# Patient Record
Sex: MALE
[De-identification: names, ages, dates, MRNs, and addresses within clinical notes are randomized per-mention and may not be internally consistent; named-entity substitution may affect disease eponyms.]

---

## 2019-01-31 ENCOUNTER — HOSPITAL ENCOUNTER (INPATIENT)
Dept: HOSPITAL 31 - C.ER | Age: 29
LOS: 2 days | Discharge: HOME | DRG: 751 | End: 2019-02-02
Attending: PSYCHIATRY & NEUROLOGY | Admitting: PSYCHIATRY & NEUROLOGY
Payer: MEDICAID

## 2019-01-31 VITALS — OXYGEN SATURATION: 99 % | RESPIRATION RATE: 18 BRPM

## 2019-01-31 DIAGNOSIS — R45.851: ICD-10-CM

## 2019-01-31 DIAGNOSIS — Z65.3: ICD-10-CM

## 2019-01-31 DIAGNOSIS — F33.2: Primary | ICD-10-CM

## 2019-01-31 DIAGNOSIS — F10.20: ICD-10-CM

## 2019-01-31 NOTE — PCM.PSYCH
Initial Psychiatric Evaluation





- Initial Psychiatric Evaluation


Type of Admission: Voluntary


Legal Status: Capacity


Chief Complaint (in patient's own words): 





I was feeling depressed and suicidal.


History of Present Illness and Precipitating Events: 





Patient is a 28 years old  male, who was transferred from [facility], came to the ED with depressed mood and suicidal ideation.





Patient reports history of few inpatient psychiatric hospitalizations.  However 

he denies any follow-up with any psychiatrist.  Patient reports that he is been 

drinking since 12 years of age.  He reports of drinking 3-4 beers daily..  To

xicology is positive for PCP as well, however patient denies.  As per the 

patient yesterday he became increasingly depressed and developed suicidal 

ideation and came to the hospital to get help.  He reports depressed mood, at 

times feelings of hopelessness and helplessness.  He also reports poor sleep and

poor appetite.  He denies any auditory or visual hallucination and paranoia.





He that last time he had a verbal altercation with the girlfriend, he wrote a 

note on Facebook that he wants to die in peace.  Girlfriend got concerned, and 

she called the police and patient was escorted to the ED for further evaluation.

 (Last time) Patient reports history of legal charges including high check in 

the car, trespassing and street fights.  He reports that he spent 3 months in 

penitentiary, and currently he is on probation.





Past psychiatric history


None reported








Past Psychiatric History





- Past Psychiatric History


Previous Treatment History: Inpatient


Pertinent Medical Hx (Current Medical&Sleep Prob, Allergies): 





                                    Allergies











Allergy/AdvReac Type Severity Reaction Status Date / Time


 


No Known Allergies Allergy   Unverified 01/31/19 09:04








                                        





No Known Home Med  01/31/19 











Review of Systems





- Review of Systems


All systems: reviewed and no additional remarkable complaints except





- Psychiatric


Psychiatric: Anxiety, Hopelessness, Irritability, Suicidal Ideation





Mental Status Examination





- Personal Presentation


Personal Presentation: Looks stated age





- Affect


Affect: Constricted, Depressed





- Motor Activity


Motor Activity: Calm





- Reliability in Providing Information


Reliability in Providing Information: Fair





- Speech


Speech: Organized





- Mood


Mood: Depressed, Anxious





- Formal Thought Process


Formal Thought Process: Delusions, Loosening of associations





- Obsessions/Compulsions


Obsessions: No


Compulsions: No





- Cognitive Functions


Orientation: Person, Place, Situation, Time


Sensorium: Alert


Attention/Concentration: Attentive


Abstract Thinking: [place]


Estimate of Intelligence: Below average


Judgement: Imparied, as evidence by: Poor judgement, Imparied, as evidence by: 

Lack of insight into illness





- Risk


Risk: Suicidal, Diminished functioning





- Limitations


Limitations: Living alone





DSM 5 DX





- DSM 5


DSM 5 Diagnosis: 





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


Alcohol use disorder moderate





- Recommended/Plan of Treatment


Treatment Recommendations and Plan of Treatment: 





CBT


Psychoeducation supportive therapy


[place] therapy and group therapy and group therapy


Augmentation


Testing for insomnia


Depakote stabilization of mood


Trazodone for insomnia





- Smoking Cessation


Smoking Cessation Initiated: No
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
## 2019-01-31 NOTE — PCM.BM
<Suzanna Morrissey - Last Filed: 01/31/19 12:18>





Treatment Plan Problems





- Problems identified on initial assessmt


  ** Alterate thought process


Date Initiated: 01/31/19


Time Initiated: 09:45


Date resolved: 01/31/19


Assessment reference: NA


Status: Active





  ** Ineffective coping skills


Date Initiated: 01/31/19


Time Initiated: 09:45


Date resolved: 01/31/19


Assessment reference: NA


Status: Active





Treatment assets and liabiliti


Patient Assests: insightful, resourceful, physically healthy


Patient Liabilities: financial problems, substance abuse, legal issue





- Milieu Protocol


Maintain good personal hygiene: daily Encourage regular showers, daily Remind 

patient to perform daily oral care, daily Assist patient to perform ADL's


Maintain personal safety: every shift Educate patient to report safety concerns 

to staff, every shift Monitor environment for contraband/sharps


Medication safety: Monitor for expected outcome, potential side effects: every 

shift, Assess barriers to learning: every shift, Assess readiness for medication

education: every shift





<Cristela Salgado - Last Filed: 02/01/19 11:40>





Family Contact


Family involvement: Family/SO is involved


Family contact: Patient declines to allow family contact at present





- Goals for Treatment


Patient goals for treatment: "I need an outpatient program."





Discharge/Continuing Care





- Education Needs


Education Needs: Patient Medication, Patient Coping Skills





- Discharge


Discharge Criteria: Tolerates medication w/o severe side effects, Reduction of 

target symptoms


Discharge to:: Home, With Family





- Treatment Team Participation


Discussed with Family/SO: No


Was Patient/Family/SO present at Treatment Team Meeting: Yes

## 2019-02-01 VITALS — SYSTOLIC BLOOD PRESSURE: 113 MMHG | HEART RATE: 63 BPM | DIASTOLIC BLOOD PRESSURE: 75 MMHG

## 2019-02-01 VITALS — TEMPERATURE: 98.6 F

## 2019-02-02 NOTE — PCM.PYCHDC
Mental Status Examination





- Mental Status Examination


Orientation: Person, Place, Situation, Time


Memory: Intact


Mood: Neutral


Affect: Other (Appropriate)


Speech: Appropriate


Attention: WNL


Concentration: WNL


Association: WNL


Fund of Knowledge: WNL


Formal Thought Process: No Impairment


Description of patient's judgement and insight: 





Fair


Psychotic Thoughts and Behaviors: 





None


Suicidal Ideation: No


Current Homicidal Ideation?: No





Discharge Summary





- Discharge Note


Reason for Hospitalization: 





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


Alcohol use disorder moderate





Laboratory Data: 





Reviewed


Consultations:: List each consultation separately and include:  1. Reason for 

request.  2. Findings.  3. Follow-up


Summary of Hospital Course include:: 1. Description of specific treatment plan 

utilized for patients during their course of treatmen.  2. Summarize the time-

course for resolution of acute symptoms and/or regressed behaviors.  3. Describe

issues identified and worked on during hospitalization.  4. Describe medication 

utilized.  5. Describe medical problems identified and treated.  6. Reassessment

of suicide risk


Summary of Hospital Course: 








Patient is a 28 years old  male, who was transferred from [facility], came to the ED with depressed mood and suicidal ideation.





Patient reports history of few inpatient psychiatric hospitalizations.  However 

he denies any follow-up with any psychiatrist.  Patient reports that he is been 

drinking since 12 years of age.  He reports of drinking 3-4 beers daily..  

Toxicology is positive for PCP as well, however patient denies.  As per the 

patient yesterday he became increasingly depressed and developed suicidal 

ideation and came to the hospital to get help.  He reports depressed mood, at 

times feelings of hopelessness and helplessness.  He also reports poor sleep and

poor appetite.  He denies any auditory or visual hallucination and paranoia.





He that last time he had a verbal altercation with the girlfriend, he wrote a 

note on Facebook that he wants to die in peace.  Girlfriend got concerned, and 

she called the police and patient was escorted to the ED for further evaluation.

 (Last time) Patient reports history of legal charges including high check in 

the car, trespassing and street fights.  He reports that he spent 3 months in 

nursing home, and currently he is on probation.





Past psychiatric history


None reported





During his stay in the hospital patient was treated with sertraline, gabapentin 

and other PRN medications.  Patient started feeling better.  Patient signed a 

48-hour notice for discharge ending today.  According to staff patient refused 

to rescind the notice.  Education provided to complete the treatment, still 

patient refused.  





At the time of evaluation and discharge, patient was awake, alert and oriented 

x3.  Patient had no delusions, no auditory or visual hallucinations, no suicidal

ideations or homicidal ideations at the time of evaluation and discharged.  

Patient was discharged in a stable condition.





- Final Diagnosis (DSM 5)


Condition upon Discharge: GOOD


Disposition: HOME/ ROUTINE





- Smoking Cessation


Smoking Cessation Medication prescribed: No





- Antipsychotic Medications


Pt discharged on 2 or more routine antipsychotic medications: No

## 2019-02-02 NOTE — PCM.PYCHPN
Psychiatric Progress Note





- Psychiatric Progress Note


Patient seen today, length of contact: 15 min


Patient Chief Complaint: 





I was feeling depressed and suicidal.


Medication Change: Yes


Medical Record Reviewed: Yes





Mental Status Examination





- Cognitive Function


Orientation: Person, Place, Situation, Time


Memory: Intact


Attention: WNL


Association: WNL


Fund of Knowledge: Poor





- Mood


Mood: Depressed, Anxious





- Affect


Affect: Constricted, Depressed





- Speech


Speech: Soft





- Formal Thought Process


Formal Thought Process: Delusions, Loosening of associations





- Suicidal Ideation


Suicidal Ideation: No





- Homicidal Ideation


Homicidal Ideation: No





Goal/Treatment Plan





- Goal/Treatment Plan


Need for Continued Stay: Remain at risks for inpatient hospitalization, Severe 

depression anxiety


Progress Toward Problem(s) and Goals/Treatment Plan: 





CBT


Psychoeducation supportive therapy


[place] therapy and group therapy and group therapy


Neurontin for augmentation


Depakote stabilization of mood


Trazodone for insomnia